# Patient Record
Sex: FEMALE | Race: WHITE | NOT HISPANIC OR LATINO | URBAN - METROPOLITAN AREA
[De-identification: names, ages, dates, MRNs, and addresses within clinical notes are randomized per-mention and may not be internally consistent; named-entity substitution may affect disease eponyms.]

---

## 2020-07-25 ENCOUNTER — TELEPHONE ENCOUNTER (OUTPATIENT)
Dept: URBAN - METROPOLITAN AREA CLINIC 13 | Facility: CLINIC | Age: 55
End: 2020-07-25

## 2020-07-26 ENCOUNTER — TELEPHONE ENCOUNTER (OUTPATIENT)
Dept: URBAN - METROPOLITAN AREA CLINIC 13 | Facility: CLINIC | Age: 55
End: 2020-07-26

## 2020-07-26 RX ORDER — LISINOPRIL 20 MG/1
TAKE 1 TABLET DAILY AS DIRECTED TABLET ORAL
Refills: 0 | Status: ACTIVE | COMMUNITY
Start: 2018-05-16

## 2020-07-26 RX ORDER — CLONAZEPAM 1 MG/1
TABLET ORAL
Qty: 15 | Refills: 0 | Status: ACTIVE | COMMUNITY
Start: 2019-05-22

## 2020-07-26 RX ORDER — CITALOPRAM 20 MG/1
TABLET ORAL
Qty: 30 | Refills: 0 | Status: ACTIVE | COMMUNITY
Start: 2018-04-20

## 2020-07-26 RX ORDER — METFORMIN HYDROCHLORIDE 500 MG/1
TABLET, COATED ORAL
Qty: 60 | Refills: 0 | Status: ACTIVE | COMMUNITY
Start: 2018-05-17

## 2020-07-26 RX ORDER — FLUCONAZOLE 100 MG/1
TABLET ORAL
Qty: 11 | Refills: 0 | Status: ACTIVE | COMMUNITY
Start: 2018-03-27

## 2020-07-26 RX ORDER — PREDNISONE 10 MG/1
TABLET ORAL
Qty: 10 | Refills: 0 | Status: ACTIVE | COMMUNITY
Start: 2018-07-11

## 2020-07-26 RX ORDER — DESVENLAFAXINE SUCCINATE 50 MG/1
TABLET, EXTENDED RELEASE ORAL
Qty: 30 | Refills: 0 | Status: ACTIVE | COMMUNITY
Start: 2018-06-19

## 2020-07-26 RX ORDER — AZELASTINE HYDROCHLORIDE AND FLUTICASONE PROPIONATE 137; 50 UG/1; UG/1
SPRAY, METERED NASAL
Qty: 23 | Refills: 0 | Status: ACTIVE | COMMUNITY
Start: 2019-04-01

## 2020-07-26 RX ORDER — MONTELUKAST SODIUM 10 MG/1
TAKE 1 TABLET DAILY AS DIRECTED TABLET, FILM COATED ORAL
Refills: 0 | Status: ACTIVE | COMMUNITY
Start: 2018-12-17

## 2020-07-26 RX ORDER — IPRATROPIUM BROMIDE 42 UG/1
SPRAY, METERED NASAL
Qty: 15 | Refills: 0 | Status: ACTIVE | COMMUNITY
Start: 2018-06-28

## 2020-07-26 RX ORDER — AMOXICILLIN AND CLAVULANATE POTASSIUM 875; 125 MG/1; MG/1
TABLET, FILM COATED ORAL
Qty: 20 | Refills: 0 | Status: ACTIVE | COMMUNITY
Start: 2019-04-01

## 2020-07-26 RX ORDER — METHYLPREDNISOLONE 4 MG/1
TABLET ORAL
Qty: 21 | Refills: 0 | Status: ACTIVE | COMMUNITY
Start: 2018-09-15

## 2020-07-26 RX ORDER — FLUCONAZOLE 150 MG/1
TABLET ORAL
Qty: 2 | Refills: 0 | Status: ACTIVE | COMMUNITY
Start: 2018-06-28

## 2020-07-26 RX ORDER — POTASSIUM CHLORIDE 1500 MG/1
TABLET, FILM COATED, EXTENDED RELEASE ORAL
Qty: 60 | Refills: 0 | Status: ACTIVE | COMMUNITY
Start: 2019-05-31

## 2020-07-26 RX ORDER — AMOXICILLIN 500 MG/1
CAPSULE ORAL
Qty: 28 | Refills: 0 | Status: ACTIVE | COMMUNITY
Start: 2019-07-18

## 2020-07-26 RX ORDER — NYSTATIN 100000 [USP'U]/ML
SUSPENSION ORAL
Qty: 150 | Refills: 0 | Status: ACTIVE | COMMUNITY
Start: 2018-05-01

## 2020-07-26 RX ORDER — PRAVASTATIN SODIUM 10 MG/1
TABLET ORAL
Qty: 30 | Refills: 0 | Status: ACTIVE | COMMUNITY
Start: 2018-06-19

## 2020-07-26 RX ORDER — AZITHROMYCIN DIHYDRATE 500 MG/1
TABLET, FILM COATED ORAL
Qty: 5 | Refills: 0 | Status: ACTIVE | COMMUNITY
Start: 2018-06-28

## 2020-07-26 RX ORDER — LEVOFLOXACIN 500 MG/1
TABLET, FILM COATED ORAL
Qty: 10 | Refills: 0 | Status: ACTIVE | COMMUNITY
Start: 2018-12-17

## 2020-07-26 RX ORDER — PREDNISONE 10 MG/1
TABLET ORAL
Qty: 10 | Refills: 0 | Status: ACTIVE | COMMUNITY
Start: 2019-04-01

## 2020-07-26 RX ORDER — EMPAGLIFLOZIN 10 MG/1
TABLET, FILM COATED ORAL
Qty: 30 | Refills: 0 | Status: ACTIVE | COMMUNITY
Start: 2019-07-25

## 2020-07-26 RX ORDER — AMOXICILLIN AND CLAVULANATE POTASSIUM 875; 125 MG/1; MG/1
TABLET, FILM COATED ORAL
Qty: 20 | Refills: 0 | Status: ACTIVE | COMMUNITY
Start: 2018-07-11

## 2020-07-26 RX ORDER — ONDANSETRON HYDROCHLORIDE 4 MG/1
TABLET, FILM COATED ORAL
Qty: 15 | Refills: 0 | Status: ACTIVE | COMMUNITY
Start: 2019-05-22

## 2020-07-26 RX ORDER — DESVENLAFAXINE SUCCINATE 100 MG/1
TABLET, EXTENDED RELEASE ORAL
Qty: 30 | Refills: 0 | Status: ACTIVE | COMMUNITY
Start: 2018-07-17

## 2020-07-26 RX ORDER — BENZONATATE 200 MG/1
CAPSULE ORAL
Qty: 30 | Refills: 0 | Status: ACTIVE | COMMUNITY
Start: 2018-06-28

## 2020-07-26 RX ORDER — DOXYCYCLINE HYCLATE 100 MG/1
TABLET ORAL
Qty: 30 | Refills: 0 | Status: ACTIVE | COMMUNITY
Start: 2018-03-27

## 2020-07-26 RX ORDER — FLUTICASONE PROPIONATE 50 UG/1
USE 1 SPRAY IN EACH NOSTRIL ONCE DAILY SPRAY, METERED NASAL
Refills: 0 | Status: ACTIVE | COMMUNITY
Start: 2018-06-19

## 2020-07-26 RX ORDER — PRAVASTATIN SODIUM 10 MG/1
TAKE 1 TABLET DAILY AS DIRECTED TABLET ORAL
Refills: 0 | Status: ACTIVE | COMMUNITY
Start: 2018-12-18

## 2020-07-26 RX ORDER — ETODOLAC 400 MG/1
TABLET, COATED ORAL
Qty: 12 | Refills: 0 | Status: ACTIVE | COMMUNITY
Start: 2019-07-18

## 2020-07-26 RX ORDER — DESVENLAFAXINE SUCCINATE 100 MG/1
TAKE 1 TABLET DAILY TABLET, EXTENDED RELEASE ORAL
Refills: 0 | Status: ACTIVE | COMMUNITY

## 2020-07-26 RX ORDER — PANTOPRAZOLE SODIUM 40 MG/1
TAKE 1 TABLET DAILY TABLET, DELAYED RELEASE ORAL
Refills: 11 | Status: ACTIVE | COMMUNITY
Start: 2018-04-19

## 2020-07-26 RX ORDER — AZITHROMYCIN DIHYDRATE 500 MG/1
TABLET, FILM COATED ORAL
Qty: 5 | Refills: 0 | Status: ACTIVE | COMMUNITY
Start: 2019-05-10

## 2020-07-26 RX ORDER — LIRAGLUTIDE 6 MG/ML
INJECTION SUBCUTANEOUS
Qty: 6 | Refills: 0 | Status: ACTIVE | COMMUNITY
Start: 2018-05-21

## 2020-07-26 RX ORDER — FLUCONAZOLE 150 MG/1
TABLET ORAL
Qty: 1 | Refills: 0 | Status: ACTIVE | COMMUNITY
Start: 2019-01-30

## 2020-07-26 RX ORDER — DESVENLAFAXINE SUCCINATE 50 MG/1
TABLET, EXTENDED RELEASE ORAL
Qty: 30 | Refills: 0 | Status: ACTIVE | COMMUNITY
Start: 2018-11-06

## 2020-07-26 RX ORDER — CHLORTHALIDONE 25 MG/1
TAKE 1 TABLET ONCE DAILY TABLET ORAL
Refills: 0 | Status: ACTIVE | COMMUNITY
Start: 2018-05-17

## 2020-07-26 RX ORDER — DOXYCYCLINE HYCLATE 100 MG/1
TABLET ORAL
Qty: 30 | Refills: 0 | Status: ACTIVE | COMMUNITY
Start: 2018-10-31

## 2020-11-06 ENCOUNTER — WEB ENCOUNTER (OUTPATIENT)
Dept: URBAN - METROPOLITAN AREA CLINIC 113 | Facility: CLINIC | Age: 55
End: 2020-11-06

## 2020-11-06 ENCOUNTER — LAB OUTSIDE AN ENCOUNTER (OUTPATIENT)
Dept: URBAN - METROPOLITAN AREA CLINIC 113 | Facility: CLINIC | Age: 55
End: 2020-11-06

## 2020-11-06 ENCOUNTER — OFFICE VISIT (OUTPATIENT)
Dept: URBAN - METROPOLITAN AREA CLINIC 113 | Facility: CLINIC | Age: 55
End: 2020-11-06
Payer: COMMERCIAL

## 2020-11-06 ENCOUNTER — TELEPHONE ENCOUNTER (OUTPATIENT)
Dept: URBAN - METROPOLITAN AREA CLINIC 113 | Facility: CLINIC | Age: 55
End: 2020-11-06

## 2020-11-06 VITALS
SYSTOLIC BLOOD PRESSURE: 148 MMHG | DIASTOLIC BLOOD PRESSURE: 84 MMHG | BODY MASS INDEX: 28.25 KG/M2 | HEART RATE: 92 BPM | RESPIRATION RATE: 18 BRPM | WEIGHT: 180 LBS | TEMPERATURE: 98.2 F | HEIGHT: 67 IN

## 2020-11-06 DIAGNOSIS — R11.2 NAUSEA & VOMITING: ICD-10-CM

## 2020-11-06 DIAGNOSIS — R74.8 ELEVATED LIVER ENZYMES: ICD-10-CM

## 2020-11-06 DIAGNOSIS — K21.9 GERD: ICD-10-CM

## 2020-11-06 DIAGNOSIS — R19.7 DIARRHEA: ICD-10-CM

## 2020-11-06 PROCEDURE — 3017F COLORECTAL CA SCREEN DOC REV: CPT | Performed by: PHYSICIAN ASSISTANT

## 2020-11-06 PROCEDURE — G8427 DOCREV CUR MEDS BY ELIG CLIN: HCPCS | Performed by: PHYSICIAN ASSISTANT

## 2020-11-06 PROCEDURE — 99214 OFFICE O/P EST MOD 30 MIN: CPT | Performed by: PHYSICIAN ASSISTANT

## 2020-11-06 RX ORDER — PANTOPRAZOLE SODIUM 40 MG/1
TAKE 1 TABLET DAILY TABLET, DELAYED RELEASE ORAL
OUTPATIENT
Start: 2018-04-19

## 2020-11-06 RX ORDER — DESVENLAFAXINE SUCCINATE 100 MG/1
TAKE 1 TABLET DAILY TABLET, EXTENDED RELEASE ORAL
Refills: 0 | Status: ACTIVE | COMMUNITY

## 2020-11-06 RX ORDER — DAPAGLIFLOZIN 10 MG/1
1 TABLET TABLET, FILM COATED ORAL ONCE A DAY
Status: ACTIVE | COMMUNITY

## 2020-11-06 RX ORDER — SPIRONOLACTONE 25 MG/1
1 TABLET TABLET, FILM COATED ORAL
Status: ACTIVE | COMMUNITY

## 2020-11-06 RX ORDER — LISINOPRIL 20 MG/1
TAKE 1 TABLET DAILY AS DIRECTED TABLET ORAL
Refills: 0 | Status: ACTIVE | COMMUNITY
Start: 2018-05-16

## 2020-11-06 RX ORDER — FAMOTIDINE 10 MG/1
1 TABLET AS NEEDED TABLET, FILM COATED ORAL TWICE A DAY
Status: ACTIVE | COMMUNITY

## 2020-11-06 RX ORDER — FAMOTIDINE 10 MG/1
1 TABLET AS NEEDED TABLET, FILM COATED ORAL TWICE A DAY
OUTPATIENT

## 2020-11-06 RX ORDER — PANTOPRAZOLE SODIUM 40 MG/1
TAKE 1 TABLET DAILY TABLET, DELAYED RELEASE ORAL
Refills: 11 | Status: ACTIVE | COMMUNITY
Start: 2018-04-19

## 2020-11-06 NOTE — HPI-TODAY'S VISIT:
Ms. Peres is a 55-year-old woman with a history of T2DM, presenting for follow-up regarding elevated liver enzymes. She was seen in February, 2019 regarding hepatic steatosis with normal LFTs.  Weight reduction was encouraged.  Her GERD symptoms were controlled on PPI.  She reported a normal colonoscopy at age 51 in [place], [place].  She has recently had increasing symptoms with nausea, vomiting, and diarrhea.  She reports edema and hypokalemia.  Her potassium has normalized, she was just started on Aldactone.  She is no longer having vomiting, but reports persistent intermittent nausea.  She was having daily heartburn on Pepcid, now on pantoprazole, heartburn is improved.  She continues to have diarrhea, she has 4 loose stools per day.  Prior to this episode, she had a normal stool daily. She has a loose stool anytime she eats.  She denies any recent antibiotic use.  Her blood sugars are "good."  She does not check them, states her last A1C was 7.9.   She takes NSAIDs for headaches, once every 1-2 weeks. She is scheduled for a gastric emptying study the week of Thanksgiving in November.

## 2020-11-06 NOTE — HPI-OTHER HISTORIES
Abdominal ultrasound 11/5/2020 : Hepatomegaly, diffuse hepatic fatty infiltration, normal gallbladder. Labs 10/29/2020 : Negative H. pylori antibody, glucose 262, otherwise normal BMP, total bilirubin 0.3, alkaline phosphatase 78, AST 38, ALT 53, normal amylase and lipase. Abd US (12/20/18) : diffuse hepatic fatty infiltration. Labs (12/17/18) : WBC 6.0, hemoglobin 12.9, MCV 88, platelets 275, BUN 9, creatinine 0.61, TB 0.2, ALP 61, AST 18, ALT 16, albumin 4.4. Lipase 65. Negative H. pylori antibody.

## 2020-11-09 ENCOUNTER — TELEPHONE ENCOUNTER (OUTPATIENT)
Dept: URBAN - METROPOLITAN AREA CLINIC 113 | Facility: CLINIC | Age: 55
End: 2020-11-09

## 2020-11-19 ENCOUNTER — CLAIMS CREATED FROM THE CLAIM WINDOW (OUTPATIENT)
Dept: URBAN - METROPOLITAN AREA CLINIC 4 | Facility: CLINIC | Age: 55
End: 2020-11-19
Payer: COMMERCIAL

## 2020-11-19 ENCOUNTER — OFFICE VISIT (OUTPATIENT)
Dept: URBAN - METROPOLITAN AREA SURGERY CENTER 25 | Facility: SURGERY CENTER | Age: 55
End: 2020-11-19
Payer: COMMERCIAL

## 2020-11-19 ENCOUNTER — OFFICE VISIT (OUTPATIENT)
Dept: URBAN - METROPOLITAN AREA SURGERY CENTER 25 | Facility: SURGERY CENTER | Age: 55
End: 2020-11-19

## 2020-11-19 DIAGNOSIS — K29.81 DUODENITIS WITH BLEEDING: ICD-10-CM

## 2020-11-19 DIAGNOSIS — K31.89 ACQUIRED DEFORMITY OF DUODENUM: ICD-10-CM

## 2020-11-19 DIAGNOSIS — K31.9 DISEASE OF STOMACH AND DUODENUM, UNSPECIFIED: ICD-10-CM

## 2020-11-19 DIAGNOSIS — K29.80 ACUTE DUODENITIS: ICD-10-CM

## 2020-11-19 PROCEDURE — 88305 TISSUE EXAM BY PATHOLOGIST: CPT | Performed by: PATHOLOGY

## 2020-11-19 PROCEDURE — 88312 SPECIAL STAINS GROUP 1: CPT | Performed by: PATHOLOGY

## 2020-11-19 PROCEDURE — 43239 EGD BIOPSY SINGLE/MULTIPLE: CPT | Performed by: INTERNAL MEDICINE

## 2020-11-19 PROCEDURE — 88342 IMHCHEM/IMCYTCHM 1ST ANTB: CPT | Performed by: PATHOLOGY

## 2020-11-19 RX ORDER — PANTOPRAZOLE SODIUM 40 MG/1
TAKE 1 TABLET DAILY TABLET, DELAYED RELEASE ORAL
Status: ACTIVE | COMMUNITY
Start: 2018-04-19

## 2020-11-19 RX ORDER — FAMOTIDINE 10 MG/1
1 TABLET AS NEEDED TABLET, FILM COATED ORAL TWICE A DAY
Status: ACTIVE | COMMUNITY

## 2020-11-19 RX ORDER — DAPAGLIFLOZIN 10 MG/1
1 TABLET TABLET, FILM COATED ORAL ONCE A DAY
Status: ACTIVE | COMMUNITY

## 2020-11-19 RX ORDER — SPIRONOLACTONE 25 MG/1
1 TABLET TABLET, FILM COATED ORAL
Status: ACTIVE | COMMUNITY

## 2020-11-19 RX ORDER — DESVENLAFAXINE SUCCINATE 100 MG/1
TAKE 1 TABLET DAILY TABLET, EXTENDED RELEASE ORAL
Refills: 0 | Status: ACTIVE | COMMUNITY

## 2020-11-19 RX ORDER — LISINOPRIL 20 MG/1
TAKE 1 TABLET DAILY AS DIRECTED TABLET ORAL
Refills: 0 | Status: ACTIVE | COMMUNITY
Start: 2018-05-16

## 2020-12-09 ENCOUNTER — DASHBOARD ENCOUNTERS (OUTPATIENT)
Age: 55
End: 2020-12-09

## 2020-12-09 ENCOUNTER — OFFICE VISIT (OUTPATIENT)
Dept: URBAN - METROPOLITAN AREA CLINIC 113 | Facility: CLINIC | Age: 55
End: 2020-12-09
Payer: COMMERCIAL

## 2020-12-09 VITALS
WEIGHT: 178 LBS | RESPIRATION RATE: 18 BRPM | DIASTOLIC BLOOD PRESSURE: 98 MMHG | TEMPERATURE: 97.6 F | SYSTOLIC BLOOD PRESSURE: 141 MMHG | BODY MASS INDEX: 27.94 KG/M2 | HEART RATE: 86 BPM | HEIGHT: 67 IN

## 2020-12-09 DIAGNOSIS — R11.2 NAUSEA & VOMITING: ICD-10-CM

## 2020-12-09 DIAGNOSIS — K21.9 GERD: ICD-10-CM

## 2020-12-09 DIAGNOSIS — R74.8 ELEVATED LIVER ENZYMES: ICD-10-CM

## 2020-12-09 DIAGNOSIS — R19.7 DIARRHEA: ICD-10-CM

## 2020-12-09 PROBLEM — 707724006 ELEVATED LIVER ENZYMES LEVEL: Status: ACTIVE | Noted: 2020-11-06

## 2020-12-09 PROBLEM — 16932000 NAUSEA AND VOMITING: Status: ACTIVE | Noted: 2020-11-06

## 2020-12-09 PROBLEM — 235595009 GASTROESOPHAGEAL REFLUX DISEASE: Status: ACTIVE | Noted: 2020-11-06

## 2020-12-09 PROBLEM — 62315008 DIARRHEA: Status: ACTIVE | Noted: 2020-11-06

## 2020-12-09 PROCEDURE — G8427 DOCREV CUR MEDS BY ELIG CLIN: HCPCS | Performed by: PHYSICIAN ASSISTANT

## 2020-12-09 PROCEDURE — 99213 OFFICE O/P EST LOW 20 MIN: CPT | Performed by: PHYSICIAN ASSISTANT

## 2020-12-09 RX ORDER — PRAVASTATIN SODIUM 20 MG/1
1 TABLET TABLET ORAL ONCE A DAY
Status: ACTIVE | COMMUNITY

## 2020-12-09 RX ORDER — DESVENLAFAXINE SUCCINATE 100 MG/1
TAKE 1 TABLET DAILY TABLET, EXTENDED RELEASE ORAL
Refills: 0 | Status: ACTIVE | COMMUNITY

## 2020-12-09 RX ORDER — FAMOTIDINE 10 MG/1
1 TABLET AS NEEDED TABLET, FILM COATED ORAL TWICE A DAY
Status: ACTIVE | COMMUNITY

## 2020-12-09 RX ORDER — PANTOPRAZOLE SODIUM 40 MG/1
TAKE 1 TABLET DAILY TABLET, DELAYED RELEASE ORAL
Status: ACTIVE | COMMUNITY
Start: 2018-04-19

## 2020-12-09 RX ORDER — SPIRONOLACTONE 25 MG/1
1 TABLET TABLET, FILM COATED ORAL
Status: ACTIVE | COMMUNITY

## 2020-12-09 RX ORDER — PANTOPRAZOLE SODIUM 40 MG/1
TAKE 1 TABLET DAILY TABLET, DELAYED RELEASE ORAL ONCE A DAY
Qty: 90 TABLETS | Refills: 3
Start: 2018-04-19

## 2020-12-09 RX ORDER — DAPAGLIFLOZIN 10 MG/1
1 TABLET TABLET, FILM COATED ORAL ONCE A DAY
Status: ACTIVE | COMMUNITY

## 2020-12-09 RX ORDER — FAMOTIDINE 40 MG/1
1 TABLET AT BEDTIME TABLET, FILM COATED ORAL ONCE A DAY
Qty: 90 TABLET | Refills: 3 | OUTPATIENT
Start: 2020-12-09

## 2020-12-09 RX ORDER — LISINOPRIL 20 MG/1
TAKE 1 TABLET DAILY AS DIRECTED TABLET ORAL
Refills: 0 | Status: ACTIVE | COMMUNITY
Start: 2018-05-16

## 2020-12-09 NOTE — HPI-TODAY'S VISIT:
Ms. Peres is a 55-year-old woman presenting for follow-up. She was seen on 11/6/2020 regarding a mild, asymptomatic elevation in ALT with hepatic steatosis noted on ultrasound.  Labs to evaluate for viral, hereditary autoimmune etiologies of chronic liver disease disorder and weight loss was encouraged.  She also complained of multiple chronic GI symptoms with recent exacerbation.  EGD was scheduled and she was to change pantoprazole to 30 minutes prior to breakfast.  She was to begin a fiber supplement and colonoscopy was a consideration.  She was scheduled for a gastric emptying study, which had already been ordered by her PCP. EGD was performed on 11/19/2020.  Findings included diffuse gastric erythema status post biopsy showing foveolar hyperplasia negative H. pylori and erythematous duodenopathy with biopsy showing peptic duodenitis. Labs ordered are not available for review.  These were completed at [facility] in [place].  She had a gastric emptying study done at [facility], she was told it was normal. Her PCP discontinued Victoza, she has found her symptoms improve.  Her nausea and vomiting has mostly resolved.  She is taking pantoprazole 40mg qam and Pepcid 20mg OTC, she is no longer having breakthrough heartburn or reflux.  She denies any abdominal pain.  Her bowel habits are normal, she is no longer having diarrhea.

## 2020-12-09 NOTE — HPI-OTHER HISTORIES
Abdominal ultrasound (11/5/20): Hepatomegaly, diffuse hepatic fatty infiltration, normal gallbladder. Labs (10/29/20): Negative H. pylori antibody, glucose 262, otherwise normal BMP, total bilirubin 0.3, alkaline phosphatase 78, AST 38, ALT 53, normal amylase and lipase. Abd US (12/20/18) : diffuse hepatic fatty infiltration. Labs (12/17/18) : WBC 6.0, hemoglobin 12.9, MCV 88, platelets 275, BUN 9, creatinine 0.61, TB 0.2, ALP 61, AST 18, ALT 16, albumin 4.4. Lipase 65. Negative H. pylori antibody. Normal colonoscopy at age 51 with Dr. Pablo in [place], [place].

## 2020-12-14 ENCOUNTER — TELEPHONE ENCOUNTER (OUTPATIENT)
Dept: URBAN - METROPOLITAN AREA CLINIC 113 | Facility: CLINIC | Age: 55
End: 2020-12-14

## 2021-01-15 ENCOUNTER — OFFICE VISIT (OUTPATIENT)
Dept: URBAN - METROPOLITAN AREA CLINIC 113 | Facility: CLINIC | Age: 56
End: 2021-01-15

## 2021-03-09 ENCOUNTER — OFFICE VISIT (OUTPATIENT)
Dept: URBAN - METROPOLITAN AREA CLINIC 113 | Facility: CLINIC | Age: 56
End: 2021-03-09